# Patient Record
Sex: MALE | Race: BLACK OR AFRICAN AMERICAN | NOT HISPANIC OR LATINO | Employment: OTHER | URBAN - METROPOLITAN AREA
[De-identification: names, ages, dates, MRNs, and addresses within clinical notes are randomized per-mention and may not be internally consistent; named-entity substitution may affect disease eponyms.]

---

## 2019-02-21 ENCOUNTER — TELEPHONE (OUTPATIENT)
Dept: PULMONOLOGY | Facility: CLINIC | Age: 79
End: 2019-02-21

## 2019-02-21 DIAGNOSIS — J44.9 CHRONIC OBSTRUCTIVE PULMONARY DISEASE, UNSPECIFIED COPD TYPE: Primary | ICD-10-CM

## 2019-03-28 ENCOUNTER — HOSPITAL ENCOUNTER (OUTPATIENT)
Dept: RADIOLOGY | Facility: HOSPITAL | Age: 79
Discharge: HOME | End: 2019-03-28
Attending: INTERNAL MEDICINE
Payer: MEDICARE

## 2019-03-28 ENCOUNTER — CLINICAL SUPPORT (OUTPATIENT)
Dept: PULMONOLOGY | Facility: CLINIC | Age: 79
End: 2019-03-28
Payer: MEDICARE

## 2019-03-28 ENCOUNTER — OFFICE VISIT (OUTPATIENT)
Dept: PULMONOLOGY | Facility: CLINIC | Age: 79
End: 2019-03-28
Payer: MEDICARE

## 2019-03-28 VITALS
DIASTOLIC BLOOD PRESSURE: 70 MMHG | OXYGEN SATURATION: 85 % | HEART RATE: 91 BPM | SYSTOLIC BLOOD PRESSURE: 122 MMHG | RESPIRATION RATE: 17 BRPM | WEIGHT: 184 LBS | BODY MASS INDEX: 33.86 KG/M2 | HEIGHT: 62 IN

## 2019-03-28 DIAGNOSIS — J44.9 CHRONIC OBSTRUCTIVE PULMONARY DISEASE, UNSPECIFIED COPD TYPE: ICD-10-CM

## 2019-03-28 DIAGNOSIS — J44.1 COPD EXACERBATION: ICD-10-CM

## 2019-03-28 DIAGNOSIS — J44.1 CHRONIC OBSTRUCTIVE PULMONARY DISEASE WITH ACUTE EXACERBATION: ICD-10-CM

## 2019-03-28 DIAGNOSIS — R06.02 SHORTNESS OF BREATH: ICD-10-CM

## 2019-03-28 DIAGNOSIS — R91.8 ABNORMALITY OF LUNG ON CXR: Primary | ICD-10-CM

## 2019-03-28 DIAGNOSIS — R09.02 EXERCISE HYPOXEMIA: ICD-10-CM

## 2019-03-28 DIAGNOSIS — R05.3 PERSISTENT COUGH: ICD-10-CM

## 2019-03-28 DIAGNOSIS — R05.3 COUGH, PERSISTENT: ICD-10-CM

## 2019-03-28 PROBLEM — E03.9 ACQUIRED HYPOTHYROIDISM: Status: ACTIVE | Noted: 2018-12-14

## 2019-03-28 PROBLEM — R60.0 BILATERAL LEG EDEMA: Status: ACTIVE | Noted: 2018-12-14

## 2019-03-28 PROBLEM — I10 ESSENTIAL HYPERTENSION: Status: ACTIVE | Noted: 2018-12-14

## 2019-03-28 PROBLEM — N28.9 RENAL INSUFFICIENCY: Status: ACTIVE | Noted: 2018-12-14

## 2019-03-28 LAB
BRPFT: ABNORMAL
DLCO ADJ PRE: 8.26 ML/(MIN*MMHG) (ref 12.17–26.03)
DLCO SINGLE BREATH LLN: 12.17
DLCO SINGLE BREATH PRE REF: 43.2 %
DLCO SINGLE BREATH REF: 19.1
DLCOC SBVA LLN: 2.03
DLCOC SBVA PRE REF: 81.6 %
DLCOC SBVA REF: 3.5
DLCOC SINGLE BREATH LLN: 12.17
DLCOC SINGLE BREATH PRE REF: 43.2 %
DLCOC SINGLE BREATH REF: 19.1
DLCOVA LLN: 2.03
DLCOVA PRE REF: 81.6 %
DLCOVA PRE: 2.85 ML/(MIN*MMHG*L) (ref 2.03–4.97)
DLCOVA REF: 3.5
DLVAADJ PRE: 2.85 ML/(MIN*MMHG*L) (ref 2.03–4.97)
ERV LLN: 0.77
ERV PRE REF: 65 %
ERV REF: 0.77
FEF 25 75 LLN: 0.48
FEF 25 75 PRE REF: 8 %
FEF 25 75 REF: 1.51
FEV1 FVC LLN: 63
FEV1 FVC PRE REF: 29.5 %
FEV1 FVC REF: 77
FEV1 LLN: 1.28
FEV1 PRE REF: 24 %
FEV1 REF: 1.93
FEV6 LLN: 1.67
FEV6 PRE REF: 48.6 %
FEV6 PRE: 1.16 L (ref 1.67–3.11)
FEV6 REF: 2.39
FRCPLETH LLN: 2.28
FRCPLETH PREREF: 88.5 %
FRCPLETH REF: 3.27
FVC LLN: 1.76
FVC PRE REF: 81.1 %
FVC REF: 2.5
IVC PRE: 1.84 L (ref 1.76–3.24)
IVC SINGLE BREATH LLN: 1.76
IVC SINGLE BREATH PRE REF: 73.7 %
IVC SINGLE BREATH REF: 2.5
MVV LLN: 70
MVV PRE REF: 26.7 %
MVV REF: 82
PEF LLN: 3.79
PEF PRE REF: 46.2 %
PEF REF: 5.86
PRE DLCO: 8.26 ML/(MIN*MMHG) (ref 12.17–26.03)
PRE ERV: 0.5 L (ref 0.77–0.77)
PRE FEF 25 75: 0.12 L/S (ref 0.48–2.54)
PRE FET 100: 19.31 SEC
PRE FEV1 FVC: 22.77 % (ref 62.98–91.31)
PRE FEV1: 0.46 L (ref 1.28–2.57)
PRE FRC PL: 2.89 L
PRE FVC: 2.03 L (ref 1.76–3.24)
PRE MVV: 22 L/MIN (ref 69.94–94.62)
PRE PEF: 2.71 L/S (ref 3.79–7.93)
PRE RV: 2.19 L (ref 1.82–3.17)
PRE TLC: 4.22 L (ref 4.31–6.62)
RAW LLN: 3.06
RAW PRE REF: 439.3 %
RAW PRE: 13.44 CMH2O*S/L (ref 3.06–3.06)
RAW REF: 3.06
RV LLN: 1.82
RV PRE REF: 87.8 %
RV REF: 2.5
RVTLC LLN: 35
RVTLC PRE REF: 119.2 %
RVTLC PRE: 51.95 % (ref 34.62–52.58)
RVTLC REF: 44
TLC LLN: 4.31
TLC PRE REF: 77.3 %
TLC REF: 5.46
VA PRE: 2.91 L (ref 5.31–5.31)
VA SINGLE BREATH LLN: 5.31
VA SINGLE BREATH PRE REF: 54.7 %
VA SINGLE BREATH REF: 5.31
VC LLN: 1.76
VC PRE REF: 81.1 %
VC PRE: 2.03 L (ref 1.76–3.24)
VC REF: 2.5
VTGRAWPRE: 3.15 L

## 2019-03-28 PROCEDURE — 94726 PLETHYSMOGRAPHY LUNG VOLUMES: CPT | Mod: S$GLB,,, | Performed by: INTERNAL MEDICINE

## 2019-03-28 PROCEDURE — 94010 BREATHING CAPACITY TEST: CPT | Mod: S$GLB,,, | Performed by: INTERNAL MEDICINE

## 2019-03-28 PROCEDURE — 94726 PULM FUNCT TST PLETHYSMOGRAP: ICD-10-PCS | Mod: S$GLB,,, | Performed by: INTERNAL MEDICINE

## 2019-03-28 PROCEDURE — 1101F PR PT FALLS ASSESS DOC 0-1 FALLS W/OUT INJ PAST YR: ICD-10-PCS | Mod: CPTII,S$GLB,, | Performed by: INTERNAL MEDICINE

## 2019-03-28 PROCEDURE — 94640 PR INHAL RX, AIRWAY OBST/DX SPUTUM INDUCT: ICD-10-PCS | Mod: 59,S$GLB,, | Performed by: INTERNAL MEDICINE

## 2019-03-28 PROCEDURE — 94729 PR C02/MEMBANE DIFFUSE CAPACITY: ICD-10-PCS | Mod: S$GLB,,, | Performed by: INTERNAL MEDICINE

## 2019-03-28 PROCEDURE — 99205 OFFICE O/P NEW HI 60 MIN: CPT | Mod: 25,S$GLB,, | Performed by: INTERNAL MEDICINE

## 2019-03-28 PROCEDURE — 96372 THER/PROPH/DIAG INJ SC/IM: CPT | Mod: 59,S$GLB,, | Performed by: INTERNAL MEDICINE

## 2019-03-28 PROCEDURE — 96372 PR INJECTION,THERAP/PROPH/DIAG2ST, IM OR SUBCUT: ICD-10-PCS | Mod: 59,S$GLB,, | Performed by: INTERNAL MEDICINE

## 2019-03-28 PROCEDURE — 94010 BREATHING CAPACITY TEST: ICD-10-PCS | Mod: S$GLB,,, | Performed by: INTERNAL MEDICINE

## 2019-03-28 PROCEDURE — 71046 X-RAY EXAM CHEST 2 VIEWS: CPT | Mod: 26,,, | Performed by: RADIOLOGY

## 2019-03-28 PROCEDURE — 71046 X-RAY EXAM CHEST 2 VIEWS: CPT | Mod: TC

## 2019-03-28 PROCEDURE — 71046 XR CHEST PA AND LATERAL: ICD-10-PCS | Mod: 26,,, | Performed by: RADIOLOGY

## 2019-03-28 PROCEDURE — 99999 PR PBB SHADOW E&M-EST. PATIENT-LVL IV: ICD-10-PCS | Mod: PBBFAC,,, | Performed by: INTERNAL MEDICINE

## 2019-03-28 PROCEDURE — 99205 PR OFFICE/OUTPT VISIT, NEW, LEVL V, 60-74 MIN: ICD-10-PCS | Mod: 25,S$GLB,, | Performed by: INTERNAL MEDICINE

## 2019-03-28 PROCEDURE — 94729 DIFFUSING CAPACITY: CPT | Mod: S$GLB,,, | Performed by: INTERNAL MEDICINE

## 2019-03-28 PROCEDURE — 94640 AIRWAY INHALATION TREATMENT: CPT | Mod: 59,S$GLB,, | Performed by: INTERNAL MEDICINE

## 2019-03-28 PROCEDURE — 1101F PT FALLS ASSESS-DOCD LE1/YR: CPT | Mod: CPTII,S$GLB,, | Performed by: INTERNAL MEDICINE

## 2019-03-28 PROCEDURE — 99999 PR PBB SHADOW E&M-EST. PATIENT-LVL IV: CPT | Mod: PBBFAC,,, | Performed by: INTERNAL MEDICINE

## 2019-03-28 RX ORDER — PREDNISONE 20 MG/1
TABLET ORAL
Qty: 20 TABLET | Refills: 1 | Status: SHIPPED | OUTPATIENT
Start: 2019-03-28

## 2019-03-28 RX ORDER — LEVOTHYROXINE SODIUM 50 UG/1
TABLET ORAL
Refills: 3 | COMMUNITY
Start: 2019-03-11

## 2019-03-28 RX ORDER — FUROSEMIDE 40 MG/1
40 TABLET ORAL
COMMUNITY
Start: 2017-02-10

## 2019-03-28 RX ORDER — SIMVASTATIN 40 MG/1
40 TABLET, FILM COATED ORAL
COMMUNITY

## 2019-03-28 RX ORDER — CLONIDINE HYDROCHLORIDE 0.2 MG/1
0.2 TABLET ORAL DAILY
Refills: 1 | COMMUNITY
Start: 2018-12-30

## 2019-03-28 RX ORDER — NIFEDIPINE 90 MG/1
90 TABLET, FILM COATED, EXTENDED RELEASE ORAL
COMMUNITY
Start: 2015-12-09

## 2019-03-28 RX ORDER — ZOLPIDEM TARTRATE 10 MG/1
10 TABLET ORAL
COMMUNITY

## 2019-03-28 RX ORDER — FLUTICASONE FUROATE AND VILANTEROL 100; 25 UG/1; UG/1
1 POWDER RESPIRATORY (INHALATION)
COMMUNITY
Start: 2018-12-16 | End: 2019-03-28 | Stop reason: SDUPTHER

## 2019-03-28 RX ORDER — ASPIRIN 81 MG/1
81 TABLET ORAL
COMMUNITY

## 2019-03-28 RX ORDER — ALBUTEROL SULFATE 0.83 MG/ML
2.5 SOLUTION RESPIRATORY (INHALATION)
Status: COMPLETED | OUTPATIENT
Start: 2019-03-28 | End: 2019-03-28

## 2019-03-28 RX ORDER — MOEXIPRIL HYDROCHLORIDE 15 MG/1
15 TABLET, FILM COATED ORAL
COMMUNITY
Start: 2015-12-10

## 2019-03-28 RX ORDER — POTASSIUM CHLORIDE 20 MEQ/1
20 TABLET, EXTENDED RELEASE ORAL DAILY
Refills: 11 | COMMUNITY
Start: 2019-02-25

## 2019-03-28 RX ORDER — CHOLECALCIFEROL (VITAMIN D3) 25 MCG
2000 TABLET ORAL
COMMUNITY

## 2019-03-28 RX ORDER — OMEPRAZOLE 20 MG/1
20 CAPSULE, DELAYED RELEASE ORAL DAILY
Refills: 5 | COMMUNITY
Start: 2019-01-31

## 2019-03-28 RX ORDER — AZITHROMYCIN 250 MG/1
TABLET, FILM COATED ORAL
Qty: 6 TABLET | Refills: 0 | Status: SHIPPED | OUTPATIENT
Start: 2019-03-28

## 2019-03-28 RX ORDER — FLUTICASONE PROPIONATE 50 MCG
SPRAY, SUSPENSION (ML) NASAL
Refills: 3 | COMMUNITY
Start: 2019-01-02

## 2019-03-28 RX ORDER — FLUTICASONE FUROATE AND VILANTEROL 100; 25 UG/1; UG/1
1 POWDER RESPIRATORY (INHALATION) DAILY
Qty: 60 EACH | Refills: 11 | Status: SHIPPED | OUTPATIENT
Start: 2019-03-28

## 2019-03-28 RX ORDER — ALBUTEROL SULFATE 0.83 MG/ML
2.5 SOLUTION RESPIRATORY (INHALATION)
Qty: 270 ML | Refills: 11 | Status: SHIPPED | OUTPATIENT
Start: 2019-03-28 | End: 2020-03-27

## 2019-03-28 RX ORDER — TRIAMCINOLONE ACETONIDE 40 MG/ML
80 INJECTION, SUSPENSION INTRA-ARTICULAR; INTRAMUSCULAR
Status: COMPLETED | OUTPATIENT
Start: 2019-03-28 | End: 2019-03-28

## 2019-03-28 RX ORDER — MONTELUKAST SODIUM 10 MG/1
TABLET ORAL
COMMUNITY
Start: 2017-03-03

## 2019-03-28 RX ADMIN — TRIAMCINOLONE ACETONIDE 80 MG: 40 INJECTION, SUSPENSION INTRA-ARTICULAR; INTRAMUSCULAR at 12:03

## 2019-03-28 RX ADMIN — ALBUTEROL SULFATE 2.5 MG: 0.83 SOLUTION RESPIRATORY (INHALATION) at 12:03

## 2019-03-28 NOTE — LETTER
March 28, 2019      Alek Muñoz III, MD  [address] [facility] [address]  [place] [address]           [facility]  [address] [place] [address]  Phone: [phone]  Fax: [phone]          Patient: Clay Menendez   MR Number: [id number]   YOB: 1940   Date of Visit: 3/28/2019           Thank you for referring Clay Menendez to me for evaluation. Attached you will find relevant portions of my assessment and plan of care.    If you have questions, please do not hesitate to call me. I look forward to following Clay Menendez along with you.    Sincerely,    Dalton Moreno MD    Enclosure  CC:  John Galdamez MD    IIf you would like to receive this communication electronically, please contact [email or web address] or [phone] to request [employer] Link access.    [employer] Link is a tool which provides read-only access to select patient information with whom you have a relationship. Its easy to use and provides real time access to review your patients record including encounter summaries, notes, results, and demographic information.    If you feel you have received this communication in error or would no longer like to receive these types of communications, please e-mail [email or web address]

## 2019-03-28 NOTE — PROGRESS NOTES
Subjective:       Patient ID: Clay Menendez is a 78 y.o. male.    Chief Complaint:   He   presents for evaluation and treatment of chonric hypoxic respiraroty falure and exacerbation of chronic obstructive pulmonary disease in 2018  after being discharged from the [facility]  3  months ago. Since discharge symptoms have unchanged course since that time. Patient denies hemoptyssis. Symptoms are aggravated by activity. Symptoms improve with rest.  Respiratory: positive for cough, dyspnea on exertion, sputum and wheezing; Cardiovascular: no chest pain or palpitations.  Patient currently is not on home oxygen therapy.      MEDICAL RECORDS FROM THE HOSPITAL REVIEWED:    COPD    HPI  Past Medical History:   Diagnosis Date    Chronic obstructive pulmonary disease with acute exacerbation 3/16/2017    Hypertension     Lung disease     Thyroid disease      History reviewed. No pertinent surgical history.  Social History     Socioeconomic History    Marital status:      Spouse name: Not on file    Number of children: Not on file    Years of education: Not on file    Highest education level: Not on file   Occupational History    Not on file   Social Needs    Financial resource strain: Not on file    Food insecurity:     Worry: Not on file     Inability: Not on file    Transportation needs:     Medical: Not on file     Non-medical: Not on file   Tobacco Use    Smoking status: Former Smoker     Packs/day: 1.00     Years: 47.00     Pack years: 47.00     Types: Cigarettes     Start date: 1955     Last attempt to quit: 2002     Years since quittin.2    Smokeless tobacco: Never Used    Tobacco comment: quit    Substance and Sexual Activity    Alcohol use: Yes     Binge frequency: Less than monthly    Drug use: Never    Sexual activity: Not Currently   Lifestyle    Physical activity:     Days per week: Not on file     Minutes per session: Not  on file    Stress: Not on file   Relationships    Social connections:     Talks on phone: Not on file     Gets together: Not on file     Attends Church service: Not on file     Active member of club or organization: Not on file     Attends meetings of clubs or organizations: Not on file     Relationship status: Not on file    Intimate partner violence:     Fear of current or ex partner: Not on file     Emotionally abused: Not on file     Physically abused: Not on file     Forced sexual activity: Not on file   Other Topics Concern    Not on file   Social History Narrative    Not on file     Review of Systems   Constitutional: Positive for fatigue. Negative for fever.   HENT: Positive for postnasal drip, rhinorrhea and congestion.    Eyes: Negative for redness and itching.   Respiratory: Positive for cough, sputum production, shortness of breath, dyspnea on extertion, use of rescue inhaler and Paroxysmal Nocturnal Dyspnea.    Cardiovascular: Negative for chest pain, palpitations and leg swelling.   Genitourinary: Negative for difficulty urinating and hematuria.   Endocrine: Negative for cold intolerance and heat intolerance.    Skin: Negative for rash.   Gastrointestinal: Negative for nausea and abdominal pain.   Neurological: Negative for dizziness, syncope, weakness and light-headedness.   Hematological: Negative for adenopathy. Does not bruise/bleed easily.   Psychiatric/Behavioral: Negative for sleep disturbance. The patient is not nervous/anxious.        Objective:      Physical Exam   Constitutional: He is oriented to person, place, and time. He appears well-developed and well-nourished.   HENT:   Head: Normocephalic and atraumatic.   Eyes: Pupils are equal, round, and reactive to light. Conjunctivae are normal.   Neck: Neck supple. No JVD present. No tracheal deviation present. No thyromegaly present.   Cardiovascular: Normal rate and regular rhythm.   No murmur heard.  Pulmonary/Chest: He has decreased  breath sounds. He has wheezes in the right lower field and the left lower field. He has no rhonchi. He has no rales.   Abdominal: Soft. Bowel sounds are normal.   Musculoskeletal: Normal range of motion. He exhibits no edema or tenderness.   Lymphadenopathy:     He has no cervical adenopathy.   Neurological: He is alert and oriented to person, place, and time.   Skin: Skin is warm and dry.   Nursing note and vitals reviewed.    Personal Diagnostic Review    X-Ray Chest PA And Lateral  Narrative: EXAMINATION:  XR CHEST PA AND LATERAL    CLINICAL HISTORY:  Chronic obstructive pulmonary disease, unspecified    TECHNIQUE:  PA and lateral views of the chest were performed.    COMPARISON:  None    FINDINGS:  Heart size within normal limits.  Few scattered basilar areas of scarring or subsegmental atelectasis.  There is asymmetric prominence right hilar soft tissues.  CT recommended to ensure no underlying neoplasm and/or adenopathy.  Trachea normal in position.  Aorta minimally tortuous with underlying atherosclerotic calcification.  Spondylosis and generalized osteopenia.  There is multilevel marginal spurring.  Prominent degenerative change bilateral shoulders, greater on the right.  Impression: Asymmetric prominence right hilar soft tissues.  CT recommended to ensure no underlying mass and/or adenopathy.    Minimal bibasilar scarring and/or subsegmental atelectasis without consolidation or effusion.    Degenerative changes throughout the spine and shoulders.    Electronically signed by: Flynn Payne MD  Date:    03/28/2019  Time:    11:03          Pulmonary Function Studies: severe obstruction  ARTERIAL BG  Component Name  12/10/2018     89.1 (L)   7.388   44.6   57.2 (L)   26.3 (H)   27.6 (H)   0.9   Left Radial       .[id number]  No flowsheet data found.      Echo Ab[facility]poqf17/17/2018  [facility]  Component Name Value Ref Range   Ao peak jordan 183.00 cm/s   AV peak gradient 13.00  mmHg   Aortic root 3.50 cm   IVS 1.06 0.6 - 1.1 cm   LVIDD 3.83 3.5 - 6 cm   LVIDS 2.76 2.1 - 4 cm   LVOT diameter 2.10 cm   FM LV CVOD LVOT 2.10 cm   PW 1.30 (A) 0.6 - 1.1 cm   FS 28 28 - 44 %   Interventricular Septum/Left Ventricular PWD by 2D 0.82     LVOT area 3.46 cm2   LA size 3.10 cm   Left Atrium to Aortic Root Ratio 0.89     MV mean gradient 9.00 mmHg   MV VTI 38.80 cm   Mitral Valve Max Velocity 223.00 cm/s   MV peak gradient 20.00 mmHg   Echo RV Mid 2.49 cm   PW 0.68 0.6 - 1.1 cm   LV mass 151.19 g   IVC proximal 1.6 cm   Sinus 3.5 cm   Echo EF Estimated 60 %   Other Result Information   This result has an attachment that is not available.   Result Narrative   Interpretation unremarkable findings was made by echo technician.  Technically it is limited study.  Below is my interpretation.    Aortic valve:   No aortic stenosis. No aortic regurgitation.  Mitral valve: Trivial mitral regurgitation.  No mitral stenosis.  Tricuspid valve:   No tricuspid regurgitation.  Pulmonary valve:  No pulmonary regurgitation noted.  Left ventricle:  Normal size.  No LVH.  Normal left ventricular wall   motion with overall LVEF estimated at 60%.  Normal left ventricular   diastolic function.  Right ventricle:  Normal in size.  Normal systolic function.  Right atrium and left atrium:  Normal RA and LA size.   Pericardium:  No pericardial effusion noted.    Summary  LVEF of 60%.  Trivial mitral regurgitation.   Status Results Details   Abnormal Encounter Summary       Assessment:       1. Abnormality of lung on CXR    2. Shortness of breath    3. COPD exacerbation    4. Exercise hypoxemia    5. Chronic obstructive pulmonary disease, unspecified COPD type    6. Cough, persistent    7. Persistent cough        Outpatient Encounter Medications as of 3/28/2019   Medication Sig Dispense Refill    aspirin (ECOTRIN) 81 MG EC tablet Take 81 mg by mouth.      cloNIDine (CATAPRES) 0.2 MG tablet Take 0.2 mg by mouth once daily.  1     fluticasone (FLONASE) 50 mcg/actuation nasal spray SPRAY 2 SPRAYS (100 MCG) IN EACH NOSTRIL ONCE DAILY  3    fluticasone-vilanterol (BREO ELLIPTA) 100-25 mcg/dose diskus inhaler Inhale 1 puff into the lungs once daily. 60 each 11    furosemide (LASIX) 40 MG tablet Take 40 mg by mouth.      ipratropium-albuterol (COMBIVENT RESPIMAT)  mcg/actuation inhaler Inhale 2 puffs into the lungs 4 (four) times daily as needed. 4 g 11    levothyroxine (SYNTHROID) 50 MCG tablet TAKE 1 TABLET BY MOUTH EVERY DAY IN THE MORNING ON EMPTY STOMACH 30  3    moexipril (UNIVASC) 15 MG Tab Take 15 mg by mouth.      montelukast (SINGULAIR) 10 mg tablet 1 TAB(S) BY MOUTH DAILY      NIFEdipine (ADALAT CC) 90 MG TbSR Take 90 mg by mouth.      omeprazole (PRILOSEC) 20 MG capsule Take 20 mg by mouth once daily.  5    potassium chloride SA (K-DUR,KLOR-CON) 20 MEQ tablet Take 20 mEq by mouth once daily.  11    simvastatin (ZOCOR) 40 MG tablet Take 40 mg by mouth.      vitamin D (VITAMIN D3) 1000 units Tab Take 2,000 Units by mouth.      zolpidem (AMBIEN) 10 mg Tab Take 10 mg by mouth.      [DISCONTINUED] fluticasone-vilanterol (BREO ELLIPTA) 100-25 mcg/dose diskus inhaler Inhale 1 puff into the lungs.      [DISCONTINUED] ipratropium-albuterol (COMBIVENT RESPIMAT)  mcg/actuation inhaler Inhale 2 puffs into the lungs 4 (four) times daily as needed.      albuterol (PROVENTIL) 2.5 mg /3 mL (0.083 %) nebulizer solution Take 3 mLs (2.5 mg total) by nebulization every 6 (six) hours while awake. 270 mL 11    azithromycin (ZITHROMAX Z-HILARIO) 250 MG tablet 500 mg on day 1 (two tablets) followed by 250 mg once daily on days 2-5 6 tablet 0    predniSONE (DELTASONE) 20 MG tablet Prednisone 60 mg/ day for 3 days, 40 mg/day for 3 days,20 mg/ day for 3 days, (1/2 tablet )10 mg a day for 3 days. 20 tablet 1     Facility-Administered Encounter Medications as of 3/28/2019   Medication Dose Route Frequency Provider Last Rate Last Dose     "albuterol nebulizer solution 2.5 mg  2.5 mg Nebulization 1 time in Clinic/HOD Dalton Moreno MD        triamcinolone acetonide injection 80 mg  80 mg Intramuscular 1 time in Clinic/HOD Dalton Moreno MD         Orders Placed This Encounter   Procedures    NEBULIZER FOR HOME USE     [facility] for [facility]/O[facility]/Nebulizer supplies.  Customer Service: [phone]  Call: [phone]  Fax: [phone]  Billing Inquiries: [phone] or [phone]       Order Specific Question:   Height:     Answer:   5' 1.8" (1.57 m)     Order Specific Question:   Weight:     Answer:   83.5 kg (184 lb)     Order Specific Question:   Length of need (1-99 months):     Answer:   99    NEBULIZER KIT (SUPPLIES) FOR HOME USE     Order Specific Question:   Height:     Answer:   5' 1.8" (1.57 m)     Order Specific Question:   Weight:     Answer:   83.5 kg (184 lb)     Order Specific Question:   Length of need (1-99 months):     Answer:   99     Order Specific Question:   Mask or Mouthpiece?     Answer:   Mouthpiece    CT Chest Without Contrast     Standing Status:   Future     Standing Expiration Date:   3/28/2020     Order Specific Question:   May the Radiologist modify the order per protocol to meet the clinical needs of the patient?     Answer:   Yes    Six Minute Walk Test to qualify for Home Oxygen     Standing Status:   Future     Standing Expiration Date:   3/28/2020     Plan:       Requested Prescriptions     Signed Prescriptions Disp Refills    albuterol (PROVENTIL) 2.5 mg /3 mL (0.083 %) nebulizer solution 270 mL 11     Sig: Take 3 mLs (2.5 mg total) by nebulization every 6 (six) hours while awake.    predniSONE (DELTASONE) 20 MG tablet 20 tablet 1     Sig: Prednisone 60 mg/ day for 3 days, 40 mg/day for 3 days,20 mg/ day for 3 days, (1/2 tablet )10 mg a day for 3 days.    azithromycin (ZITHROMAX Z-HILARIO) 250 MG tablet 6 tablet 0     Si mg on day 1 (two tablets) followed by 250 mg once daily on days 2-5    " ipratropium-albuterol (COMBIVENT RESPIMAT)  mcg/actuation inhaler 4 g 11     Sig: Inhale 2 puffs into the lungs 4 (four) times daily as needed.    fluticasone-vilanterol (BREO ELLIPTA) 100-25 mcg/dose diskus inhaler 60 each 11     Sig: Inhale 1 puff into the lungs once daily.     Abnormality of lung on CXR  -     Cancel: CTA Chest Non-Coronary; Future; Expected date: 03/28/2019  -     Cancel: Basic metabolic panel; Future; Expected date: 03/28/2019    Shortness of breath  -     Cancel: CTA Chest Non-Coronary; Future; Expected date: 03/28/2019    COPD exacerbation  -     albuterol (PROVENTIL) 2.5 mg /3 mL (0.083 %) nebulizer solution; Take 3 mLs (2.5 mg total) by nebulization every 6 (six) hours while awake.  Dispense: 270 mL; Refill: 11  -     NEBULIZER FOR HOME USE  -     NEBULIZER KIT (SUPPLIES) FOR HOME USE  -     predniSONE (DELTASONE) 20 MG tablet; Prednisone 60 mg/ day for 3 days, 40 mg/day for 3 days,20 mg/ day for 3 days, (1/2 tablet )10 mg a day for 3 days.  Dispense: 20 tablet; Refill: 1  -     azithromycin (ZITHROMAX Z-HILARIO) 250 MG tablet; 500 mg on day 1 (two tablets) followed by 250 mg once daily on days 2-5  Dispense: 6 tablet; Refill: 0  -     triamcinolone acetonide injection 80 mg  -     albuterol nebulizer solution 2.5 mg    Exercise hypoxemia  -     Six Minute Walk Test to qualify for Home Oxygen; Future    Chronic obstructive pulmonary disease, unspecified COPD type  -     Six Minute Walk Test to qualify for Home Oxygen; Future  -     ipratropium-albuterol (COMBIVENT RESPIMAT)  mcg/actuation inhaler; Inhale 2 puffs into the lungs 4 (four) times daily as needed.  Dispense: 4 g; Refill: 11  -     fluticasone-vilanterol (BREO ELLIPTA) 100-25 mcg/dose diskus inhaler; Inhale 1 puff into the lungs once daily.  Dispense: 60 each; Refill: 11    Cough, persistent  -     CT Chest Without Contrast; Future; Expected date: 03/28/2019    Persistent cough  -     CT Chest Without Contrast; Future;  Expected date: 03/28/2019            No problem-specific Assessment & Plan notes found for this encounter.    No problem-specific Assessment & Plan notes found for this encounter.                  Follow up in about 3 weeks (around 4/18/2019) for 6 min walk, Kenalog IM today, Neb Tx Demo today.    Review of medical records from hospital. Medical records from hospital were reviewed during office visit - these included but were not limited to review of radiographic studies and /or radiologists reports, laboratory studies, discharge summaries, procedure notes, consultations and other transcribed notes.    MEDICAL DECISION MAKING: Moderate to high complexity.  Overall, the multiple problems listed are of moderate to high severity that may impact quality of life and activities of daily living. Side effects of medications, treatment plan as well as options and alternatives reviewed and discussed with patient. There was counseling of patient concerning these issues.    Total time spent in face to face counseling and coordination of care - 60 minutes over 50% of time was used in discussion of prognosis, risks, benefits of treatment, instructions and compliance with regimen . Discussion with other physicians or health care providers (homehealth, durable medical equipment providers).

## 2019-04-05 ENCOUNTER — HOSPITAL ENCOUNTER (OUTPATIENT)
Dept: RADIOLOGY | Facility: HOSPITAL | Age: 79
Discharge: HOME | End: 2019-04-05
Attending: INTERNAL MEDICINE
Payer: MEDICARE

## 2019-04-05 DIAGNOSIS — R05.3 COUGH, PERSISTENT: ICD-10-CM

## 2019-04-05 DIAGNOSIS — R05.3 PERSISTENT COUGH: ICD-10-CM

## 2019-04-05 PROCEDURE — 71250 CT THORAX DX C-: CPT | Mod: 26,,, | Performed by: RADIOLOGY

## 2019-04-05 PROCEDURE — 71250 CT CHEST WITHOUT CONTRAST: ICD-10-PCS | Mod: 26,,, | Performed by: RADIOLOGY

## 2019-04-05 PROCEDURE — 71250 CT THORAX DX C-: CPT | Mod: TC

## 2019-04-22 ENCOUNTER — OFFICE VISIT (OUTPATIENT)
Dept: PULMONOLOGY | Facility: CLINIC | Age: 79
End: 2019-04-22
Payer: MEDICARE

## 2019-04-22 ENCOUNTER — CLINICAL SUPPORT (OUTPATIENT)
Dept: PULMONOLOGY | Facility: CLINIC | Age: 79
End: 2019-04-22
Payer: MEDICARE

## 2019-04-22 VITALS
DIASTOLIC BLOOD PRESSURE: 76 MMHG | SYSTOLIC BLOOD PRESSURE: 130 MMHG | HEART RATE: 81 BPM | HEIGHT: 63 IN | RESPIRATION RATE: 18 BRPM | BODY MASS INDEX: 31.48 KG/M2 | WEIGHT: 177.69 LBS | OXYGEN SATURATION: 95 %

## 2019-04-22 VITALS — BODY MASS INDEX: 31.48 KG/M2 | WEIGHT: 177.69 LBS | HEIGHT: 63 IN

## 2019-04-22 DIAGNOSIS — R09.02 EXERCISE HYPOXEMIA: ICD-10-CM

## 2019-04-22 DIAGNOSIS — G47.34 NOCTURNAL HYPOXEMIA: ICD-10-CM

## 2019-04-22 DIAGNOSIS — J44.9 CHRONIC OBSTRUCTIVE PULMONARY DISEASE, UNSPECIFIED COPD TYPE: ICD-10-CM

## 2019-04-22 DIAGNOSIS — J44.1 CHRONIC OBSTRUCTIVE PULMONARY DISEASE WITH ACUTE EXACERBATION: Primary | ICD-10-CM

## 2019-04-22 DIAGNOSIS — J47.9 BRONCHIECTASIS WITHOUT COMPLICATION: ICD-10-CM

## 2019-04-22 PROCEDURE — 99215 OFFICE O/P EST HI 40 MIN: CPT | Mod: 25,S$GLB,, | Performed by: INTERNAL MEDICINE

## 2019-04-22 PROCEDURE — 99999 PR PBB SHADOW E&M-EST. PATIENT-LVL IV: ICD-10-PCS | Mod: PBBFAC,,, | Performed by: INTERNAL MEDICINE

## 2019-04-22 PROCEDURE — 99215 PR OFFICE/OUTPT VISIT, EST, LEVL V, 40-54 MIN: ICD-10-PCS | Mod: 25,S$GLB,, | Performed by: INTERNAL MEDICINE

## 2019-04-22 PROCEDURE — 1101F PR PT FALLS ASSESS DOC 0-1 FALLS W/OUT INJ PAST YR: ICD-10-PCS | Mod: CPTII,S$GLB,, | Performed by: INTERNAL MEDICINE

## 2019-04-22 PROCEDURE — 94618 PULMONARY STRESS TESTING: CPT | Mod: S$GLB,,, | Performed by: INTERNAL MEDICINE

## 2019-04-22 PROCEDURE — 1101F PT FALLS ASSESS-DOCD LE1/YR: CPT | Mod: CPTII,S$GLB,, | Performed by: INTERNAL MEDICINE

## 2019-04-22 PROCEDURE — 99999 PR PBB SHADOW E&M-EST. PATIENT-LVL IV: CPT | Mod: PBBFAC,,, | Performed by: INTERNAL MEDICINE

## 2019-04-22 PROCEDURE — 94618 PULMONARY STRESS TESTING: ICD-10-PCS | Mod: S$GLB,,, | Performed by: INTERNAL MEDICINE

## 2019-04-22 RX ORDER — LUBIPROSTONE 8 UG/1
CAPSULE, GELATIN COATED ORAL
COMMUNITY
Start: 2019-04-18

## 2019-04-22 RX ORDER — HYDROCHLOROTHIAZIDE 12.5 MG/1
TABLET ORAL
COMMUNITY
Start: 2019-04-05

## 2019-04-22 NOTE — PROCEDURES
"[facility] - Pulmonary Function [facility]  Six Minute Walk     SUMMARY     Ordering Provider:    Interpreting Provider:   Performing nurse/tech/RT: Evangelist RRT  Diagnosis: COPD(Exercise hypoxemia)  Height: 5' 3" (160 cm)  Weight: 80.6 kg (177 lb 11.1 oz)  BMI (Calculated): 31.5                Phase Oxygen Assessment Supplemental O2 Heart   Rate Blood Pressure Gavin Dyspnea Scale Rating   Resting 94 % Room Air 91 bpm 126/61 0   Exercise        Minute        1 87 % Room Air 96 bpm     2 92 % Room Air 98 bpm     3 98 % 2 L/M 111 bpm     4 90 % 2 L/M 118 bpm     5 93 % 3 L/M 117 bpm     6  93 % 3 L/M 118 bpm 131/57 1   Recovery        Minute        1 98 % 2 L/M 93 bpm     2 100 % 2 L/M 93 bpm     3 100 % 2 L/M 97 bpm     4 92 % 2 L/M 92 bpm 130/53 1     Six Minute Walk Summary  6MWT Status: completed without stopping  Patient Reported: Dyspnea     Interpretation:  Did the patient stop or pause?: No               Total Time Walked (Calculated): 360 seconds  Final Partial Lap Distance (feet): 100 feet  Total Distance Meters (Calculated): 213.36 meters  Predicted Distance Meters (Calculated): 373.8 meters  Percentage of Predicted (Calculated): 57.08  Peak VO2 (Calculated): 10.38  Mets: 2.97  Has The Patient Had a Previous Six Minute Walk Test?: No       Previous 6MWT Results  Has The Patient Had a Previous Six Minute Walk Test?: No      Interpretation:  Total distance walked in six minutes is moderately reduced indicating a reduction in overall  functional capacity. There was significant oxygen desaturation to 87% with walking. Clinical correlation suggested.    Dalton Moreno MD    Mild exercise-induced hypoxemia described as an arterial oxygen saturation of 93-95% (or 3-4% less than at rest), moderate exercise-induced hypoxemia as 89-93%, and severe exercise induced hypoxemia as < 89% O2 saturation.  Medicare Criteria Comments:   When arterial oxygen saturation is at or below 88% during exercise (severe exercise " induced hypoxemia) then the patient falls under Medicare Group 1 criteria for supplemental oxygen.  Details about Medicare Group Criteria coverage can be found at http://www.cms.hhs.gov/manuals/downloads/

## 2019-04-22 NOTE — PROGRESS NOTES
Subjective:       Patient ID: Clay Menendez is a 79 y.o. male.    Chief Complaint: COPD    HPI COPD  He presents for evaluation and treatment of COPD. The patient is not currently have symptoms / an exacerbation. The patient has COPD for approximately 17 years years. Symptoms in previous episodes have included dyspnea, cough and wheezing, and typically last 1 month. Previous episodes have been exacerbated by moderate activity and strenuous activity. Current treatment includes albuterol inhaler, which generally provides some relief of symptoms.   He uses 1 pillows at night. Patient currently is not on home oxygen therapy.. The patient is having no constitutional symptoms, denying fever, chills, anorexia, or weight loss. The patient has been hospitalized for this condition before about 3- 4 years ago. He quit smoking approximately many years ago. The patient is experiencing exercise intolerance (difficulty climbing 3 flights of stairs).    Stopped smoking     Past Medical History:   Diagnosis Date    Bronchiectasis 2019    Chronic obstructive pulmonary disease with acute exacerbation 3/16/2017    Hypertension     Lung disease     Thyroid disease      History reviewed. No pertinent surgical history.  Social History     Socioeconomic History    Marital status:      Spouse name: Not on file    Number of children: Not on file    Years of education: Not on file    Highest education level: Not on file   Occupational History    Not on file   Social Needs    Financial resource strain: Not on file    Food insecurity:     Worry: Not on file     Inability: Not on file    Transportation needs:     Medical: Not on file     Non-medical: Not on file   Tobacco Use    Smoking status: Former Smoker     Packs/day: 1.00     Years: 47.00     Pack years: 47.00     Types: Cigarettes     Start date: 1955     Last attempt to quit: 2002     Years since quittin.3    Smokeless tobacco: Never Used     Tobacco comment: quit 2002   Substance and Sexual Activity    Alcohol use: Yes     Binge frequency: Less than monthly    Drug use: Never    Sexual activity: Not Currently   Lifestyle    Physical activity:     Days per week: Not on file     Minutes per session: Not on file    Stress: Not on file   Relationships    Social connections:     Talks on phone: Not on file     Gets together: Not on file     Attends Religion service: Not on file     Active member of club or organization: Not on file     Attends meetings of clubs or organizations: Not on file     Relationship status: Not on file   Other Topics Concern    Not on file   Social History Narrative    Not on file     Review of Systems   Constitutional: Positive for fatigue. Negative for fever.   HENT: Positive for postnasal drip, rhinorrhea and congestion.    Eyes: Negative for redness and itching.   Respiratory: Positive for cough, sputum production, shortness of breath, dyspnea on extertion, use of rescue inhaler and Paroxysmal Nocturnal Dyspnea.    Cardiovascular: Negative for chest pain, palpitations and leg swelling.   Genitourinary: Negative for difficulty urinating and hematuria.   Endocrine: Negative for cold intolerance and heat intolerance.    Skin: Negative for rash.   Gastrointestinal: Negative for nausea and abdominal pain.   Neurological: Negative for dizziness, syncope, weakness and light-headedness.   Hematological: Negative for adenopathy. Does not bruise/bleed easily.   Psychiatric/Behavioral: Negative for sleep disturbance. The patient is not nervous/anxious.        Objective:      Physical Exam   Constitutional: He is oriented to person, place, and time. He appears well-developed and well-nourished.   HENT:   Head: Normocephalic and atraumatic.   Mouth/Throat: Oropharyngeal exudate present.   Eyes: Pupils are equal, round, and reactive to light. Conjunctivae are normal.   Neck: Neck supple. No JVD present. No tracheal deviation  present. No thyromegaly present.   Cardiovascular: Normal rate, regular rhythm and normal heart sounds.   No murmur heard.  Pulmonary/Chest: Effort normal. He has decreased breath sounds. He has wheezes in the right lower field and the left lower field. He has no rhonchi. He has no rales.   Abdominal: Soft. Bowel sounds are normal.   Musculoskeletal: Normal range of motion. He exhibits no edema or tenderness.   Lymphadenopathy:     He has no cervical adenopathy.   Neurological: He is alert and oriented to person, place, and time.   Skin: Skin is warm and dry.   Nursing note and vitals reviewed.    Personal Diagnostic Review  Chest X-Ray: I personally reviewed the films and findings are:, air trapping/emphysema  Pulmonary function tests:  6 min walk - low oxygen to 87%  Pulmonary Studies Review 4/22/2019 4/22/2019 3/28/2019   SpO2 95 - 85   Ordering Provider -  -   Interpreting Provider -  -   Performing nurse/tech/RT - Evangelist RRT -   Diagnosis - COPD -   Height 63.000 63 61.800   Weight 2843.05 2843.05 2944   BMI (Calculated) 31.5 31.5 33.9   Predicted Distance 275.91 275.9 269.38   6MWT Status - completed without stopping -   Patient Reported - Dyspnea -   Was O2 used? - Yes -   Delivery Method - Cannula -   6MW Distance walked (feet) - 700 -   Distance walked (meters) - 213.36 -   Did patient stop? - No -   Type of assistive device(s) used? - no assistive devices -   Is extra documentation required for this patient? - Yes -   Oxygen Saturation - 94 -   Supplemental Oxygen - Room Air -   Heart Rate - 91 -   Blood Pressure - 126/61 -   Gavin Dyspnea Rating  - nothing at all -   Oxygen Saturation - 93 -   Supplemental Oxygen - 3 L/M -   Heart Rate - 118 -   Blood Pressure - 131/57 -   Gavin Dyspnea Rating  - very light -   Recovery Time (seconds) - 240 -   Oxygen Saturation - 92 -   Supplemental Oxygen - 2 L/M -   Heart Rate - 92 -   Blood Pressure - 130/53 -   Gavin Dyspnea Rating  - very light -   Is  procedure ready for interpretation? - Yes -   Did the patient stop or pause? - No -   Total Time Walked (Calculated) - 360 -   Total Laps Walked - 3 -   Final Partial Lap Distance (feet) - 100 -   Total Distance Feet (Calculated) - 700 -   Total Distance Meters (Calculated) - 213.36 -   Predicted Distance Meters (Calculated) 373.8 373.8 351.08   Percentage of Predicted (Calculated) - 57.08 -   Peak VO2 (Calculated) - 10.38 -   Mets - 2.97 -   Has The Patient Had a Previous Six Minute Walk Test? - No -   Oxygen Qualification? - Yes -   Oxygen Saturation - 94 -   Supplemental Oxygen - Room Air -   Heart Rate - 91 -   Blood Pressure - 126/61 -   Gavin Dyspnea Rating  - nothing at all -   Oxygen Saturation - 93 -   Supplemental Oxygen - 3 L/M -   Heart Rate - 93 -   Blood Pressure - 131/57 -   Gavin Dyspnea Rating  - very light -   Recovery Time (seconds) - 240 -   Oxygen Saturation - 100 -   Supplemental Oxygen - 2 L/M -   Heart Rate - 92 -   Blood Pressure - 130/53 -   Gavin Dyspnea Rating  - very light -     No flowsheet data found.      Assessment:       1. Chronic obstructive pulmonary disease with acute exacerbation    2. Exercise hypoxemia    3. Nocturnal hypoxemia    4. Bronchiectasis without complication        Outpatient Encounter Medications as of 4/22/2019   Medication Sig Dispense Refill    albuterol (PROVENTIL) 2.5 mg /3 mL (0.083 %) nebulizer solution Take 3 mLs (2.5 mg total) by nebulization every 6 (six) hours while awake. 270 mL 11    AMITIZA 8 mcg Cap       aspirin (ECOTRIN) 81 MG EC tablet Take 81 mg by mouth.      cloNIDine (CATAPRES) 0.2 MG tablet Take 0.2 mg by mouth once daily.  1    fluticasone (FLONASE) 50 mcg/actuation nasal spray SPRAY 2 SPRAYS (100 MCG) IN EACH NOSTRIL ONCE DAILY  3    fluticasone-vilanterol (BREO ELLIPTA) 100-25 mcg/dose diskus inhaler Inhale 1 puff into the lungs once daily. 60 each 11    furosemide (LASIX) 40 MG tablet Take 40 mg by mouth.      hydroCHLOROthiazide  (HYDRODIURIL) 12.5 MG Tab       ipratropium-albuterol (COMBIVENT RESPIMAT)  mcg/actuation inhaler Inhale 2 puffs into the lungs 4 (four) times daily as needed. 4 g 11    levothyroxine (SYNTHROID) 50 MCG tablet TAKE 1 TABLET BY MOUTH EVERY DAY IN THE MORNING ON EMPTY STOMACH 30  3    moexipril (UNIVASC) 15 MG Tab Take 15 mg by mouth.      montelukast (SINGULAIR) 10 mg tablet 1 TAB(S) BY MOUTH DAILY      NIFEdipine (ADALAT CC) 90 MG TbSR Take 90 mg by mouth.      omeprazole (PRILOSEC) 20 MG capsule Take 20 mg by mouth once daily.  5    potassium chloride SA (K-DUR,KLOR-CON) 20 MEQ tablet Take 20 mEq by mouth once daily.  11    simvastatin (ZOCOR) 40 MG tablet Take 40 mg by mouth.      vitamin D (VITAMIN D3) 1000 units Tab Take 2,000 Units by mouth.      zolpidem (AMBIEN) 10 mg Tab Take 10 mg by mouth.      azithromycin (ZITHROMAX Z-HILARIO) 250 MG tablet 500 mg on day 1 (two tablets) followed by 250 mg once daily on days 2-5 6 tablet 0    predniSONE (DELTASONE) 20 MG tablet Prednisone 60 mg/ day for 3 days, 40 mg/day for 3 days,20 mg/ day for 3 days, (1/2 tablet )10 mg a day for 3 days. 20 tablet 1     No facility-administered encounter medications on file as of 4/22/2019.      Orders Placed This Encounter   Procedures    OXYGEN FOR HOME USE     Needs battery powered Aura XMGEN portable device  [facility] for CPAP/Oxygen/Nebulizer supplies.  Customer Service: [phone]  Call: [phone]  Fax: [phone]  Billing Inquiries: [phone] or [phone]     Order Specific Question:   Liter Flow     Answer:   2     Order Specific Question:   Duration     Answer:   With activity     Order Specific Question:   Qualifying SpO2:     Answer:   87%     Order Specific Question:   Testing done at:     Answer:   Exercise/Activity     Order Specific Question:   Route     Answer:   nasal cannula     Order Specific Question:   Portable mode:     Answer:   pulse dose acceptable     Order Specific Question:    "Device     Answer:   home concentrator with portable unit     Order Specific Question:   Length of need (in months):     Answer:   99 mos     Order Specific Question:   Patient condition with qualifying saturation     Answer:   COPD     Order Specific Question:   Height:     Answer:   5' 3" (1.6 m)     Order Specific Question:   Weight:     Answer:   80.6 kg (177 lb 11.1 oz)     Order Specific Question:   Alternative treatment measures have been tried or considered and deemed clinically ineffective.     Answer:   Yes    Spirometry without Bronchodilator     Standing Status:   Future     Standing Expiration Date:   10/22/2020     Plan:       Requested Prescriptions      No prescriptions requested or ordered in this encounter     Chronic obstructive pulmonary disease with acute exacerbation  -     OXYGEN FOR HOME USE  -     Spirometry without Bronchodilator; Future; Expected date: 04/22/2019    Exercise hypoxemia  -     OXYGEN FOR HOME USE    Nocturnal hypoxemia  -     OXYGEN FOR HOME USE    Bronchiectasis without complication      Follow up for Review teto.    MEDICAL DECISION MAKING: Moderate to high complexity.  Overall, the multiple problems listed are of moderate to high severity that may impact quality of life and activities of daily living. Side effects of medications, treatment plan as well as options and alternatives reviewed and discussed with patient. There was counseling of patient concerning these issues.    Total time spent in face to face counseling and coordination of care - 40 minutes over 50% of time was used in discussion of prognosis, risks, benefits of treatment, instructions and compliance with regimen . Discussion with other physicians or health care providers (homehealth, durable medical equipment providers).    "

## 2019-09-06 DIAGNOSIS — R06.02 SOB (SHORTNESS OF BREATH): Primary | ICD-10-CM

## 2019-09-09 ENCOUNTER — CLINICAL SUPPORT (OUTPATIENT)
Dept: PULMONOLOGY | Facility: CLINIC | Age: 79
End: 2019-09-09
Payer: MEDICARE

## 2019-09-09 ENCOUNTER — OFFICE VISIT (OUTPATIENT)
Dept: PULMONOLOGY | Facility: CLINIC | Age: 79
End: 2019-09-09
Payer: MEDICARE

## 2019-09-09 VITALS
SYSTOLIC BLOOD PRESSURE: 122 MMHG | OXYGEN SATURATION: 95 % | DIASTOLIC BLOOD PRESSURE: 80 MMHG | RESPIRATION RATE: 16 BRPM | WEIGHT: 165.38 LBS | HEIGHT: 63 IN | BODY MASS INDEX: 29.3 KG/M2 | HEART RATE: 89 BPM

## 2019-09-09 DIAGNOSIS — R06.02 SOB (SHORTNESS OF BREATH): ICD-10-CM

## 2019-09-09 DIAGNOSIS — Z01.811 PRE-OP CHEST EXAM: ICD-10-CM

## 2019-09-09 DIAGNOSIS — K59.09 CHRONIC CONSTIPATION: ICD-10-CM

## 2019-09-09 DIAGNOSIS — J44.1 CHRONIC OBSTRUCTIVE PULMONARY DISEASE WITH ACUTE EXACERBATION: Primary | ICD-10-CM

## 2019-09-09 LAB
ALLENS TEST: ABNORMAL
DELSYS: ABNORMAL
FIO2: 21
HCO3 UR-SCNC: 26.6 MMOL/L (ref 24–28)
MODE: ABNORMAL
PCO2 BLDA: 44.2 MMHG (ref 35–45)
PH SMN: 7.39 [PH] (ref 7.35–7.45)
PO2 BLDA: 69 MMHG (ref 80–100)
POC BE: 2 MMOL/L
POC SATURATED O2: 93 % (ref 95–100)
SAMPLE: ABNORMAL
SITE: ABNORMAL

## 2019-09-09 PROCEDURE — 99214 PR OFFICE/OUTPT VISIT, EST, LEVL IV, 30-39 MIN: ICD-10-PCS | Mod: 25,S$GLB,, | Performed by: NURSE PRACTITIONER

## 2019-09-09 PROCEDURE — 82803 BLOOD GASES ANY COMBINATION: CPT | Mod: S$GLB,,, | Performed by: INTERNAL MEDICINE

## 2019-09-09 PROCEDURE — 1101F PR PT FALLS ASSESS DOC 0-1 FALLS W/OUT INJ PAST YR: ICD-10-PCS | Mod: CPTII,S$GLB,, | Performed by: NURSE PRACTITIONER

## 2019-09-09 PROCEDURE — 99999 PR PBB SHADOW E&M-EST. PATIENT-LVL V: CPT | Mod: PBBFAC,,, | Performed by: NURSE PRACTITIONER

## 2019-09-09 PROCEDURE — 82803 PR  BLOOD GASES: PH, PO2 & PCO2: ICD-10-PCS | Mod: S$GLB,,, | Performed by: INTERNAL MEDICINE

## 2019-09-09 PROCEDURE — 36600 PR WITHDRAWAL OF ARTERIAL BLOOD: ICD-10-PCS | Mod: S$GLB,,, | Performed by: INTERNAL MEDICINE

## 2019-09-09 PROCEDURE — 36600 WITHDRAWAL OF ARTERIAL BLOOD: CPT | Mod: S$GLB,,, | Performed by: INTERNAL MEDICINE

## 2019-09-09 PROCEDURE — 94010 BREATHING CAPACITY TEST: CPT | Mod: S$GLB,,, | Performed by: INTERNAL MEDICINE

## 2019-09-09 PROCEDURE — 99214 OFFICE O/P EST MOD 30 MIN: CPT | Mod: 25,S$GLB,, | Performed by: NURSE PRACTITIONER

## 2019-09-09 PROCEDURE — 94010 BREATHING CAPACITY TEST: ICD-10-PCS | Mod: S$GLB,,, | Performed by: INTERNAL MEDICINE

## 2019-09-09 PROCEDURE — 99999 PR PBB SHADOW E&M-EST. PATIENT-LVL V: ICD-10-PCS | Mod: PBBFAC,,, | Performed by: NURSE PRACTITIONER

## 2019-09-09 PROCEDURE — 1101F PT FALLS ASSESS-DOCD LE1/YR: CPT | Mod: CPTII,S$GLB,, | Performed by: NURSE PRACTITIONER

## 2019-09-09 RX ORDER — AMOXICILLIN 250 MG
1 CAPSULE ORAL
COMMUNITY
Start: 2019-07-17 | End: 2020-07-16

## 2019-09-09 RX ORDER — POLYETHYLENE GLYCOL-3350 AND ELECTROLYTES 236; 6.74; 5.86; 2.97; 22.74 G/274.31G; G/274.31G; G/274.31G; G/274.31G; G/274.31G
POWDER, FOR SOLUTION ORAL
COMMUNITY
Start: 2019-09-05

## 2019-09-09 RX ORDER — SODIUM, POTASSIUM,MAG SULFATES 17.5-3.13G
SOLUTION, RECONSTITUTED, ORAL ORAL
COMMUNITY
Start: 2019-09-05

## 2019-09-09 RX ORDER — LINACLOTIDE 72 UG/1
CAPSULE, GELATIN COATED ORAL
COMMUNITY
Start: 2019-09-05

## 2019-09-09 RX ORDER — POLYETHYLENE GLYCOL 3350 17 G/17G
17 POWDER, FOR SOLUTION ORAL
COMMUNITY
Start: 2019-07-17

## 2019-09-09 NOTE — PROCEDURES
See ABG results  Results for LINDA MARX (MRN [id number]) as of 9/20/2019 20:38   Ref. Range 9/9/2019 09:16   POC PH Latest Ref Range: 7.35 - 7.45  7.388   POC PCO2 Latest Ref Range: 35 - 45 mmHg 44.2   POC PO2 Latest Ref Range: 80 - 100 mmHg 69 (L)   POC BE Latest Ref Range: -2 to 2 mmol/L 2   POC HCO3 Latest Ref Range: 24 - 28 mmol/L 26.6   POC SATURATED O2 Latest Ref Range: 95 - 100 % 93 (L)   FiO2 Unknown 21   Sample Unknown ARTERIAL   DelSys Unknown Room Air   Allens Test Unknown Pass   Site Unknown LR   Mode Unknown SPONT       Interpretation:  Arterial blood gases on room air are abnormal demonstrating hypoxemia.  Dalton Moreno MD  Pulmonary / Critical Care Medicine

## 2019-09-09 NOTE — LETTER
September 9, 2019      Dalton Moreno MD  [address] [place] [address]           [facility]  [address] [place] [address]  Phone: [phone]  Fax: [phone]          Patient: Clay Menendez   MR Number: [id number]   YOB: 1940   Date of Visit: 9/9/2019       Dear Dr. Dalton Moreno:    Thank you for referring Clay Menendez to me for evaluation. Attached you will find relevant portions of my assessment and plan of care.    If you have questions, please do not hesitate to call me. I look forward to following Clay Menendez along with you.    Sincerely,    Elizabeth Lejeune, MILLA    Enclosure  CC:  No Recipients    If you would like to receive this communication electronically, please contact [email or web address] or [phone] to request more information on [employer] Link access.    For providers and/or their staff who would like to refer a patient to [facility], please contact us through our one-stop-shop provider referral line, [facility], at [phone].    If you feel you have received this communication in error or would no longer like to receive these types of communications, please e-mail [email or web address]

## 2019-09-09 NOTE — PROGRESS NOTES
"Subjective:      Patient ID: Clay Menendez is a 79 y.o. male.    Chief Complaint: Pre-op Exam    HPI  Patient presents to the office today for pulmonary preop evaluation for colonoscopy.  He states he had a colonoscopy approximately 3 years ago without pulmonary complications.  Patient has severe COPD.  Patient within the hospital 7/13/19- 7/17/19.  He states he had extreme constipation which made it hard for him to breathe.  He was admitted for COPD exacerbation.  CXR without infiltrate. He was placed on BiPAP during his hospital admission.  He is compliant with Breo.  No fever, chills, or hemoptysis. No pleuritic type chest pain. Breathing is stable as compared to 6 months ago.    /80   Pulse 89   Resp 16   Ht 5' 3" (1.6 m)   Wt 75 kg (165 lb 5.5 oz)   SpO2 95%   BMI 29.29 kg/m²   Body mass index is 29.29 kg/m².    Review of Systems   Gastrointestinal:        Chronic constipation.   All other systems reviewed and are negative.    Objective:      Physical Exam   Constitutional: He is oriented to person, place, and time. He appears well-developed and well-nourished.   HENT:   Head: Normocephalic and atraumatic.   Nose: Nose normal.   Mouth/Throat: Uvula is midline and oropharynx is clear and moist.   Neck: Trachea normal and normal range of motion. Neck supple. No thyroid mass and no thyromegaly present.   Cardiovascular: Normal rate, regular rhythm and normal heart sounds.   Pulmonary/Chest: Effort normal and breath sounds normal. He has no wheezes. He has no rhonchi. He has no rales. Chest wall is not dull to percussion.   Abdominal: Soft. He exhibits no mass. There is no hepatosplenomegaly or splenomegaly. There is no tenderness.   Musculoskeletal: Normal range of motion. He exhibits no edema.   Neurological: He is alert and oriented to person, place, and time.   Skin: Skin is warm and dry.   Psychiatric: He has a normal mood and affect.     Personal Diagnostic Review  abg  Recent Labs     " 09/09/19  0916   PH 7.388   PCO2 44.2   PO2 69*   HCO3 26.6   POCSATURATED 93*   BE 2       Results for orders placed or performed in visit on 09/09/19   Spirometry with/without bronchodilator   Result Value Ref Range    Pre FVC 2.17 1.84 - 3.38 L    Pre FEV1 0.55 (L) 1.33 - 2.67 L    Pre FEV1 FVC 25.58 (L) 62.67 - 91.03 %    Pre FEF 25 75 0.16 (L) 0.49 - 2.61 L/s    Pre PEF 2.50 (L) 3.93 - 8.23 L/s    Pre  15.44 sec    FVC Ref 2.61     FVC LLN 1.84     FVC Pre Ref 83.1 %    FEV1 Ref 2.00     FEV1 LLN 1.33     FEV1 Pre Ref 27.7 %    FEV1 FVC Ref 76.85     FEV1 FVC LLN 62.67     FEV1 FVC Pre Ref 33.3 %    MFEF 75 25 REF 1.55     MFEF 75 25 LLN 0.49     MFEF 75 25 PRE REF 10.1 %    PEF Ref 6.08     PEF LLN 3.93     PEF Pre Ref 41.1 %    MVV Ref 86.28     MVV LLN 73.34        Results for orders placed during the hospital encounter of 03/28/19   X-Ray Chest PA And Lateral    Narrative EXAMINATION:  XR CHEST PA AND LATERAL    CLINICAL HISTORY:  Chronic obstructive pulmonary disease, unspecified    TECHNIQUE:  PA and lateral views of the chest were performed.    COMPARISON:  None    FINDINGS:  Heart size within normal limits.  Few scattered basilar areas of scarring or subsegmental atelectasis.  There is asymmetric prominence right hilar soft tissues.  CT recommended to ensure no underlying neoplasm and/or adenopathy.  Trachea normal in position.  Aorta minimally tortuous with underlying atherosclerotic calcification.  Spondylosis and generalized osteopenia.  There is multilevel marginal spurring.  Prominent degenerative change bilateral shoulders, greater on the right.      Impression Asymmetric prominence right hilar soft tissues.  CT recommended to ensure no underlying mass and/or adenopathy.    Minimal bibasilar scarring and/or subsegmental atelectasis without consolidation or effusion.    Degenerative changes throughout the spine and shoulders.      Electronically signed by: Flynn Payne  MD  Date:    03/28/2019  Time:    11:03         Assessment:       1. Chronic obstructive pulmonary disease with acute exacerbation    2. Pre-op chest exam    3. Chronic constipation        Outpatient Encounter Medications as of 9/9/2019   Medication Sig Dispense Refill    albuterol (PROVENTIL) 2.5 mg /3 mL (0.083 %) nebulizer solution Take 3 mLs (2.5 mg total) by nebulization every 6 (six) hours while awake. 270 mL 11    AMITIZA 8 mcg Cap       aspirin (ECOTRIN) 81 MG EC tablet Take 81 mg by mouth.      cloNIDine (CATAPRES) 0.2 MG tablet Take 0.2 mg by mouth once daily.  1    fluticasone (FLONASE) 50 mcg/actuation nasal spray SPRAY 2 SPRAYS (100 MCG) IN EACH NOSTRIL ONCE DAILY  3    fluticasone-vilanterol (BREO ELLIPTA) 100-25 mcg/dose diskus inhaler Inhale 1 puff into the lungs once daily. 60 each 11    furosemide (LASIX) 40 MG tablet Take 40 mg by mouth.      GAVILYTE-G 236-22.74-6.74 -5.86 gram suspension       hydroCHLOROthiazide (HYDRODIURIL) 12.5 MG Tab       ipratropium-albuterol (COMBIVENT RESPIMAT)  mcg/actuation inhaler Inhale 2 puffs into the lungs 4 (four) times daily as needed. 4 g 11    levothyroxine (SYNTHROID) 50 MCG tablet TAKE 1 TABLET BY MOUTH EVERY DAY IN THE MORNING ON EMPTY STOMACH 30  3    LINZESS 72 mcg Cap capsule       moexipril (UNIVASC) 15 MG Tab Take 15 mg by mouth.      montelukast (SINGULAIR) 10 mg tablet 1 TAB(S) BY MOUTH DAILY      NIFEdipine (ADALAT CC) 90 MG TbSR Take 90 mg by mouth.      omeprazole (PRILOSEC) 20 MG capsule Take 20 mg by mouth once daily.  5    polyethylene glycol (GLYCOLAX) 17 gram PwPk Take 17 g by mouth.      potassium chloride SA (K-DUR,KLOR-CON) 20 MEQ tablet Take 20 mEq by mouth once daily.  11    predniSONE (DELTASONE) 20 MG tablet Prednisone 60 mg/ day for 3 days, 40 mg/day for 3 days,20 mg/ day for 3 days, (1/2 tablet )10 mg a day for 3 days. 20 tablet 1    senna-docusate 8.6-50 mg (PERICOLACE) 8.6-50 mg per tablet Take 1 tablet  by mouth.      simvastatin (ZOCOR) 40 MG tablet Take 40 mg by mouth.      SUPREP BOWEL PREP KIT 17.5-3.13-1.6 gram SolR       vitamin D (VITAMIN D3) 1000 units Tab Take 2,000 Units by mouth.      zolpidem (AMBIEN) 10 mg Tab Take 10 mg by mouth.      azithromycin (ZITHROMAX Z-HILARIO) 250 MG tablet 500 mg on day 1 (two tablets) followed by 250 mg once daily on days 2-5 6 tablet 0     No facility-administered encounter medications on file as of 9/9/2019.      No orders of the defined types were placed in this encounter.    Plan:     Pulmonary preop evaluation for colonoscopy.  Patient with a history of colon cancer.  Patient is at an increased risk due to COPD, but not prohibitive risk.  Agree with procedure to being performed in the hospital as opposed to clinic setting.  He denies complications approximately 3 years ago when he had this procedure performed.  No signs of infection or bronchospasm on today's exam. Continue bronchodilators.  Problem List Items Addressed This Visit        Pulmonary    Chronic obstructive pulmonary disease with acute exacerbation - Primary    Overview     BREO, Combivent           Other Visit Diagnoses     Pre-op chest exam        Chronic constipation        Relevant Medications    polyethylene glycol (GLYCOLAX) 17 gram PwPk    senna-docusate 8.6-50 mg (PERICOLACE) 8.6-50 mg per tablet    GAVILYTE-G 236-22.74-6.74 -5.86 gram suspension    SUPREP BOWEL PREP KIT 17.5-3.13-1.6 gram SolR

## 2019-09-10 LAB
BRPFT: ABNORMAL
FEF 25 75 LLN: 0.49
FEF 25 75 PRE REF: 10.1 %
FEF 25 75 REF: 1.55
FEV1 FVC LLN: 63
FEV1 FVC PRE REF: 33.3 %
FEV1 FVC REF: 77
FEV1 LLN: 1.33
FEV1 PRE REF: 27.7 %
FEV1 REF: 2
FVC LLN: 1.84
FVC PRE REF: 83.1 %
FVC REF: 2.61
PEF LLN: 3.93
PEF PRE REF: 41.1 %
PEF REF: 6.08
PRE FEF 25 75: 0.16 L/S (ref 0.49–2.61)
PRE FET 100: 15.44 SEC
PRE FEV1 FVC: 25.58 % (ref 62.67–91.03)
PRE FEV1: 0.55 L (ref 1.33–2.67)
PRE FVC: 2.17 L (ref 1.84–3.38)
PRE PEF: 2.5 L/S (ref 3.93–8.23)

## 2020-03-30 ENCOUNTER — DOCUMENTATION ONLY (OUTPATIENT)
Dept: PULMONOLOGY | Facility: CLINIC | Age: 80
End: 2020-03-30